# Patient Record
Sex: FEMALE | Race: BLACK OR AFRICAN AMERICAN | NOT HISPANIC OR LATINO | Employment: FULL TIME | URBAN - METROPOLITAN AREA
[De-identification: names, ages, dates, MRNs, and addresses within clinical notes are randomized per-mention and may not be internally consistent; named-entity substitution may affect disease eponyms.]

---

## 2023-03-25 ENCOUNTER — ANCILLARY PROCEDURE (OUTPATIENT)
Dept: GENERAL RADIOLOGY | Facility: CLINIC | Age: 18
End: 2023-03-25
Attending: FAMILY MEDICINE
Payer: COMMERCIAL

## 2023-03-25 DIAGNOSIS — R76.12 POSITIVE QUANTIFERON-TB GOLD TEST: ICD-10-CM

## 2023-03-25 PROCEDURE — 71046 X-RAY EXAM CHEST 2 VIEWS: CPT | Performed by: RADIOLOGY

## 2023-11-18 ENCOUNTER — NURSE TRIAGE (OUTPATIENT)
Dept: NURSING | Facility: CLINIC | Age: 18
End: 2023-11-18
Payer: COMMERCIAL

## 2023-11-18 NOTE — TELEPHONE ENCOUNTER
Patient calling about pain in her left foot that has been going on for the past 6 months. Patient reports that this morning when she woke up the pain in her feet was severe and unable to walk for 30 minutes. Patient states the heel and the arch of her foot is where the pain is the worst.  Patient has been treating with oil and tylenol and stretching. The shoes that she wears does not make a difference.   Pain improved after tylenol.   Patient feels like the left foot is not as strong anymore.   Protocol recommends see physician within 24 hours   location and hours given to patient.   Patient will present to [facility] and will call back with worsening symptoms.   Colleen Garcia RN   11/18/23 11:20 AM  [facility] Nurse Advisor      Reason for Disposition   Weakness (i.e., loss of strength) of new-onset in foot or toes  (Exceptions: not truly weak, foot feels weak because of pain; weakness present > 2 weeks)    Additional Information   Negative: Followed a foot injury   Negative: Diabetes mellitus   Negative: Toe pain is main symptom   Negative: Ankle pain is main symptom   Negative: Thigh or calf pain is main symptom   Negative: Entire foot is cool or blue in comparison to other foot   Negative: Purple or black skin on foot or toe   Negative: [1] Red area or streak AND [2] fever   Negative: [1] Swollen foot AND [2] fever   Negative: Patient sounds very sick or weak to the triager   Negative: [1] SEVERE pain (e.g., excruciating, unable to do any normal activities) AND [2] not improved after 2 hours of pain medicine   Negative: [1] Looks infected (spreading redness, pus) AND [2] large red area (> 2 in. or 5 cm)   Negative: Looks like a boil, infected sore, or deep ulcer   Negative: [1] Redness of the skin AND [2] no fever   Negative: [1] Swollen foot AND [2] no fever  (Exceptions: localized bump from bunions, calluses, insect bite, sting)    Protocols used: Foot Pain-A-

## 2024-01-16 NOTE — TELEPHONE ENCOUNTER
Action January 16, 2024 2:24 PM MT   Action Taken Sent a request for imaging from [facility].   Note: Received on 1/16/24.     DIAGNOSIS: Left Heel Pain   APPOINTMENT DATE: 01/19/2024   NOTES STATUS DETAILS   OFFICE NOTE from referring provider SELF    OFFICE NOTE from other specialist Internal 11/18/2023 - Nurse Triage   DISCHARGE REPORT from the ER Care Everywhere 11/22/2023 - [facility]  ED   XRAYS (IMAGES & REPORTS) PACS [facility]:  11/22/2023 - Left Foot

## 2024-01-19 ENCOUNTER — OFFICE VISIT (OUTPATIENT)
Dept: ORTHOPEDICS | Facility: CLINIC | Age: 19
End: 2024-01-19
Payer: COMMERCIAL

## 2024-01-19 ENCOUNTER — PRE VISIT (OUTPATIENT)
Dept: ORTHOPEDICS | Facility: CLINIC | Age: 19
End: 2024-01-19

## 2024-01-19 DIAGNOSIS — M79.671 PAIN OF BOTH HEELS: ICD-10-CM

## 2024-01-19 DIAGNOSIS — M72.2 PLANTAR FASCIITIS: Primary | ICD-10-CM

## 2024-01-19 DIAGNOSIS — M79.672 PAIN OF BOTH HEELS: ICD-10-CM

## 2024-01-19 PROCEDURE — 99203 OFFICE O/P NEW LOW 30 MIN: CPT | Performed by: PODIATRIST

## 2024-01-19 NOTE — PROGRESS NOTES
Date of Service: 1/19/2024    Chief Complaint:   Chief Complaint   Patient presents with    Consult     Pain, left heel.         HPI: Chantel is a 18 year old female who presents today for further evaluation of left heel pain.  Nature: sharp    Location: left heel    Duration: 1 year    Onset: gradual. No trauma    Course: worsening    Aggravating/alleviating factors: + post-static dyskinesia.     Previous Treatments: RICE.   She is starting to get pain in the right heel, though not as intense.       Review of Systems: No n/v/d/f/c/ns/sob/cp    PMH: No past medical history on file.    PSxH: No past surgical history on file.    Allergies: Patient has no allergy information on record.    SH:   Social History     Socioeconomic History    Marital status: Single     Spouse name: Not on file    Number of children: Not on file    Years of education: Not on file    Highest education level: Not on file   Occupational History    Not on file   Tobacco Use    Smoking status: Not on file    Smokeless tobacco: Not on file   Substance and Sexual Activity    Alcohol use: Not on file    Drug use: Not on file    Sexual activity: Not on file   Other Topics Concern    Not on file   Social History Narrative    Not on file     Social Determinants of Health     Financial Resource Strain: Not on file   Food Insecurity: Not on file   Transportation Needs: Not on file   Physical Activity: Not on file   Stress: Not on file   Social Connections: Not on file   Interpersonal Safety: Not on file   Housing Stability: Not on file       FH: No family history on file.    Objective:  Data Unavailable Data Unavailable Data Unavailable Data Unavailable Data Unavailable 0 lbs 0 oz    PT and DP pulses are 2/4 bilaterally. CRT is instant. Positive pedal hair.   Gross sensation is intact bilaterally.   Equinus is noted bilaterally. No pain with active or passive ROM of the ankle, MTJ, 1st ray, or halluces bilaterally,. Pain noted with palpation of the  medial attachment of the plantar fascia on the calcaneus on the left. No pain laterally. No pain in the medial band of the PF. No pain with lateral calc squeeze.,   Nails normal bilaterally. No open lesions are noted.     Assessment:   Encounter Diagnoses   Name Primary?    Plantar fasciitis Yes    Pain of both heels          Plan:  - Pt seen and evaluated.  - Orthotics molded and sent to the lab.  - Start PT.   - Night splint dispensed.  - Can escalate tx if these measures do not help.  - See again in 6 weeks.

## 2024-01-19 NOTE — NURSING NOTE
DME FITTING    Relevant Diagnosis: Left heel pain.   Night splint brace was fit on patient's Left foot.     Person(s) involved in teaching:   Patient    Brace was applied in standard [place]:  Yes  Brace fit well:  Yes  Patient reports brace to fit comfortably:  Yes    Education:   Patient shown self application and removal of brace: Yes  Patient shown how to adjust brace fit, if necessary: Yes  Patient educated on billing and return policy: Yes  Patient confirmed understanding when and how to contact clinic with concerns: Yes

## 2024-01-19 NOTE — LETTER
1/19/2024         RE: Chantel Cherry  [address] [facility] [address]        Dear Colleague,    Thank you for referring your patient, Chantel Cherry, to the [facility] [place]. Please see a copy of my visit note below.    Date of Service: 1/19/2024    Chief Complaint:   Chief Complaint   Patient presents with    Consult     Pain, left heel.         HPI: Chantel is a 18 year old female who presents today for further evaluation of left heel pain.  Nature: sharp    Location: left heel    Duration: 1 year    Onset: gradual. No trauma    Course: worsening    Aggravating/alleviating factors: + post-static dyskinesia.     Previous Treatments: RICE.   She is starting to get pain in the right heel, though not as intense.       Review of Systems: No n/v/d/f/c/ns/sob/cp    PMH: No past medical history on file.    PSxH: No past surgical history on file.    Allergies: Patient has no allergy information on record.    SH:   Social History     Socioeconomic History    Marital status: Single     Spouse name: Not on file    Number of children: Not on file    Years of education: Not on file    Highest education level: Not on file   Occupational History    Not on file   Tobacco Use    Smoking status: Not on file    Smokeless tobacco: Not on file   Substance and Sexual Activity    Alcohol use: Not on file    Drug use: Not on file    Sexual activity: Not on file   Other Topics Concern    Not on file   Social History Narrative    Not on file     Social Determinants of Health     Financial Resource Strain: Not on file   Food Insecurity: Not on file   Transportation Needs: Not on file   Physical Activity: Not on file   Stress: Not on file   Social Connections: Not on file   Interpersonal Safety: Not on file   Housing Stability: Not on file       FH: No family history on file.    Objective:  Data Unavailable Data Unavailable Data Unavailable Data Unavailable Data Unavailable 0 lbs 0 oz    PT and  DP pulses are 2/4 bilaterally. CRT is instant. Positive pedal hair.   Gross sensation is intact bilaterally.   Equinus is noted bilaterally. No pain with active or passive ROM of the ankle, MTJ, 1st ray, or halluces bilaterally,. Pain noted with palpation of the medial attachment of the plantar fascia on the calcaneus on the left. No pain laterally. No pain in the medial band of the PF. No pain with lateral calc squeeze.,   Nails normal bilaterally. No open lesions are noted.     Assessment:   Encounter Diagnoses   Name Primary?    Plantar fasciitis Yes    Pain of both heels          Plan:  - Pt seen and evaluated.  - Orthotics molded and sent to the lab.  - Start PT.   - Night splint dispensed.  - Can escalate tx if these measures do not help.  - See again in 6 weeks.            Kenneth Clark DPM

## 2024-04-20 NOTE — TELEPHONE ENCOUNTER
DIAGNOSIS: Plantar Fascitis Left Foot, [facility], Self, xrays on file. Just completed PT and wants to discuss injections.     APPOINTMENT DATE: 4.22.24   NOTES STATUS DETAILS   OFFICE NOTE from other specialist Internal 1.19.24  [facility]  Ortho   DISCHARGE REPORT from the ER CE 11.22.23  [facility]   MEDICATION LIST Internal    XRAYS (IMAGES & REPORTS) Pacs 11.22.23  XR Foot Left

## 2024-04-22 ENCOUNTER — OFFICE VISIT (OUTPATIENT)
Dept: ORTHOPEDICS | Facility: CLINIC | Age: 19
End: 2024-04-22
Payer: COMMERCIAL

## 2024-04-22 ENCOUNTER — PRE VISIT (OUTPATIENT)
Dept: ORTHOPEDICS | Facility: CLINIC | Age: 19
End: 2024-04-22

## 2024-04-22 DIAGNOSIS — M72.2 PLANTAR FASCIITIS, BILATERAL: Primary | ICD-10-CM

## 2024-04-22 PROCEDURE — 99203 OFFICE O/P NEW LOW 30 MIN: CPT | Performed by: FAMILY MEDICINE

## 2024-04-22 NOTE — LETTER
4/22/2024      RE: Chantel Cherry  [address] [facility] [address]     Dear Colleague,    Thank you for referring your patient, Chantel Cherry, to the [facility] SP[facility]IC[facility]. Please see a copy of my visit note below.      [facility]  SPORTS & ORTHOPEDIC CLINIC VISIT     Apr 22, 2024        ASSESSMENT & PLAN    18-year-old with plantar fasciitis has been refractory to inserts and night splinting.    Reviewed imaging and assessment with patient in detail  Have strongly encouraged her to follow-up with physical therapy.  Home exercise program was provided.  She is interested in steroid injection.  Explained that this is not a procedure that I performed but would be happy to assist her with scheduling with one of our podiatry partners who performs this procedure.  We briefly discussed additional procedures such as Tenex could be done if she continues to have refractory pain    Jason Zamorano MD  [facility] SP[facility]    >15 min were spent on the day of visit in direct face-to-face contact, record review, and documentation  Jason Zamorano MD    -----  Chief Complaint   Patient presents with     Left Foot - Pain       SUBJECTIVE  Chantel Cherry is a/an 18 year old female who is seen as a self referral for evaluation of  bilateral plantar fascitis .     The patient is seen with their father.  The patient is Right handed    Onset: 1 years(s) ago. Reports insidious onset without acute precipitating event.  Location of Pain: bilateral foot   Worsened by: Walking, waking up in the am, sitting for too long and then standing  Better with: Massaging with hot oil, massage gun   Treatments tried: massaging   Associated symptoms: no distal numbness or tingling; denies swelling or warmth    Orthopedic/Surgical history: NO  Social History/Occupation: Student/ work at [employer]       REVIEW OF SYSTEMS:  Do you have fever, chills,  weight loss? No  Do you have any vision problems? No  Do you have any chest pain or edema? No  Do you have any shortness of breath or wheezing?  No  Do you have stomach problems? No  Do you have any numbness or focal weakness? No  Do you have diabetes? No  Do you have problems with bleeding or clotting? No  Do you have an rashes or other skin lesions? No    OBJECTIVE:  There were no vitals taken for this visit.     Bilateral foot\: TTP over the medial aspect of the calcaneus.  No tenderness over the remainder of the plantar surface of foot.  Positive windlass    RADIOLOGY:    No imaging this visit             Again, thank you for allowing me to participate in the care of your patient.      Sincerely,    Jason Zamorano MD

## 2024-04-22 NOTE — PATIENT INSTRUCTIONS
Plantar Fasciitis Education    WHAT IS PLANTAR FASCIITIS?    Plantar fasciitis is painful irritation of the tissue on the bottom of your foot between the ball of the foot and the heel. This tough tissue that supports the arch of your foot is called fascia.    WHAT IS THE CAUSE?    Plantar fasciitis can be caused by a number of things, like:    A lot of running, jumping, walking, or stair-climbing  Wearing high heels for long periods of time  Gaining weight  If you are a runner, you may get plantar fasciitis when you start running further during each workout or if you run more often. It can also happen if you start running on a different surface or in different terrain, or if your shoes are worn out and don't give enough cushioning for your heels.    If you wear high-heeled shoes, including western-style boots, the fascia can get shorter. You may then have pain when you stretch the fascia. This painful stretching might happen, for example, when you walk barefoot after getting out of bed in the morning.    If you gain weight, you may put more stress on your feet, especially if you walk a lot or  shoes with poor cushioning.    If the arches of your foot are unusually high or low, you are more likely to develop plantar fasciitis than if your arches are normal.    WHAT ARE THE SYMPTOMS?    The main symptom of plantar fasciitis is heel pain when you walk. You may also feel pain when you stand and possibly even when you are resting. This pain typically occurs first thing in the morning when you get out of bed and put your foot flat on the floor, stretching the fascia. The pain may lessen after you have been up for a while and walking, but then the pain may come back after you have been resting.    You may not have any pain when you are sleeping because the position of your feet during rest allows the fascia to shorten and relax.    HOW IS IT DIAGNOSED?    Your healthcare provider will ask about your symptoms,  activities, and medical history and examine your foot and ankle. You may have an X-ray of your heel.    HOW IS IT TREATED?    Give your painful heel lots of rest. You will need to change or stop doing the activities that cause pain until your foot has healed. You may need to stay completely off your foot for several days when the pain is severe.    Your healthcare provider may recommend stretching and strengthening exercises to help you heal. Exercises to make the foot stronger and to stretch the fascia are very important.    Your healthcare provider may recommend shoe inserts called arch supports or orthotics. You can buy them at a pharmacy or athletic shoe store or they can be custom made. Make sure the arch supports are firm. If you can easily bend them in half, they may be too flexible. These supports can be particularly helpful if you have flat feet or high arches. Wearing athletic shoes or heel cushions in both shoes may also help. Cushions are most helpful if you are overweight or an older adult.    A night splint may help keep the plantar fascia stretched while you are sleeping.    Your provider may give you a shot of steroid medicine. Your healthcare provider may have other treatments to suggest.    With treatment, plantar fasciitis may take up to several months to heal. You may find that the pain comes and goes. Talk to your healthcare provider if you do not feel improvement with treatment.    HOW CAN I TAKE CARE OF MYSELF?    To help relieve pain:    Rest your heel on an ice pack, gel pack, or package of frozen vegetables wrapped in a cloth every 3 to 4 hours for up to 20 minutes at a time.  Keep your foot up on a pillow when you sit or lie down.  Take nonprescription pain medicine, such as acetaminophen, ibuprofen, or naproxen. Read the label and take as directed. Nonsteroidal anti-inflammatory medicines (NSAIDs), such as ibuprofen or naproxen, may cause stomach bleeding and other problems. These risks  increase with age. Unless recommended by your healthcare provider, do not take an NSAID for more than 10 days.  Follow your healthcare provider's instructions, including any exercises recommended by your provider. Ask your provider:    How and when you will hear your test results  How long it will take to recover  What activities you should avoid, including how much you can lift, and when you can return to your normal activities  How to take care of yourself at home  What symptoms or problems you should watch for and what to do if you have them  Make sure you know when you should come back for a checkup.    HOW CAN I HELP PREVENT PLANTAR FASCIITIS?    The best way to prevent plantar fasciitis is to wear well-made shoes that fit your feet and give good arch support and cushioning. This is especially important if you exercise or walk a lot or stand for a long time on hard surfaces. Get new athletic shoes before your old shoes stop supporting and cushioning your feet.    You should also:    Avoid repeated jarring to the heel.  Keep a healthy weight.  Do leg and foot stretching exercises regularly.    Developed by [employer].  Published by [employer].  Copyright  2014 [employer] and/or one of its subsidiaries. All rights reserved.      Plantar Fasciitis Exercises:    You may start strengthening the muscles of your hip and stretching the muscles of your foot right away.    Prone hip extension: Lie on your stomach with your legs straight out behind you. Fold your arms under your head and rest your head on your arms. Draw your belly button in towards your spine and tighten your abdominal muscles. Tighten the buttocks and thigh muscles of the leg on your injured side and lift the leg off the floor about 8 inches. Keep your leg straight. Hold for 5 seconds. Then lower your leg and relax. Do 2 sets of 15.    Side-lying leg lift: Lie on your uninjured side. Tighten the front thigh muscles on your injured leg and  lift that leg 8 to 10 inches (20 to 25 centimeters) away from the other leg. Keep the leg straight and lower it slowly. Do 2 sets of 15.    *Frozen can roll: Roll your bare injured foot back and forth from your heel to your mid-arch over a frozen juice can. Repeat for 3 to 5 minutes. This exercise is particularly helpful if it is done first thing in the morning.    *Towel stretch: Sit on a hard surface with your injured leg stretched out in front of you. Loop a towel around your toes and the ball of your foot and pull the towel toward your body keeping your leg straight. Hold this position for 15 to 30 seconds and then relax. Repeat 3 times.    *Standing calf stretch: Stand facing a wall with your hands on the wall at about eye level. Keep your injured leg back with your heel on the floor. Keep the other leg forward with the knee bent. Turn your back foot slightly inward (as if you were pigeon-toed). Slowly lean into the wall until you feel a stretch in the back of your calf. Hold the stretch for 15 to 30 seconds. Return to the starting position. Repeat 3 times. Do this exercise several times each day.    *Seated plantar fascia stretch: Sit in a chair and cross the injured foot over the knee of your other leg. Place your fingers over the base of your toes and pull them back toward your shin until you feel a comfortable stretch in the arch of your foot. Hold 15 seconds and repeat 3 times.    *Plantar fascia massage: Sit in a chair and cross the injured foot over the knee of your other leg. Place your fingers over the base of the toes of your injured foot and pull your toes toward your shin until you feel a stretch in the arch of your foot. With your other hand, massage the bottom of your foot, moving from the heel toward your toes. Do this for 3 to 5 minutes. Start gently. Press harder on the bottom of your foot as you become able to tolerate more pressure.    Achilles stretch: Stand with the ball of one foot on a  stair. Reach for the step below with your heel until you feel a stretch in the arch of your foot. Hold this position for 15 to 30 seconds and then relax. Repeat 3 times.  After you have stretched the bottom muscles of your foot, you can do these exercises to start strengthening the muscles of your foot.    Towel pickup: With your heel on the ground,  a towel with your toes. Release. Repeat 10 to 20 times. When this gets easy, add more resistance by placing a book or small weight on the towel.    Balance and reach exercises: Stand next to a chair with your injured leg farther from the chair. The chair will provide support if you need it. Stand on the foot of your injured leg and bend your knee slightly. Try to raise the arch of this foot while keeping your big toe on the floor. Keep your foot in this position.  With the hand that is farther away from the chair, reach forward in front of you by bending at the waist. Avoid bending your knee any more as you do this. Repeat this 15 times. To make the exercise more challenging, reach farther in front of you. Do 2 sets of 15.    While keeping your arch raised, reach the hand that is farther away from the chair across your body toward the chair. The farther you reach, the more challenging the exercise. Do 2 sets of 15.    Heel raise: Stand behind a chair or counter with both feet flat on the floor. Using the chair or counter as a support, rise up onto your toes and hold for 5 seconds. Then slowly lower yourself down without holding onto the support. (It's OK to keep holding onto the support if you need to.) When this exercise becomes less painful, try doing this exercise while you are standing on the injured leg only. Repeat 15 times. Do 2 sets of 15. Rest 30 seconds between sets.    Developed by [employer].  Published by [employer].  Copyright  2014 [employer] and/or one of its subsidiaries. All rights reserved.

## 2024-04-22 NOTE — PROGRESS NOTES
[facility]  SPORTS & ORTHOPEDIC CLINIC VISIT     Apr 22, 2024        ASSESSMENT & PLAN    18-year-old with plantar fasciitis has been refractory to inserts and night splinting.    Reviewed imaging and assessment with patient in detail  Have strongly encouraged her to follow-up with physical therapy.  Home exercise program was provided.  She is interested in steroid injection.  Explained that this is not a procedure that I performed but would be happy to assist her with scheduling with one of our podiatry partners who performs this procedure.  We briefly discussed additional procedures such as Tenex could be done if she continues to have refractory pain    Jason Zamorano MD  [facility]IC[facility] CL[facility] [place]    >15 min were spent on the day of visit in direct face-to-face contact, record review, and documentation  Jason Zamorano MD    -----  Chief Complaint   Patient presents with    Left Foot - Pain       SUBJECTIVE  Chantel Cherry is a/an 18 year old female who is seen as a self referral for evaluation of  bilateral plantar fascitis .     The patient is seen with their father.  The patient is Right handed    Onset: 1 years(s) ago. Reports insidious onset without acute precipitating event.  Location of Pain: bilateral foot   Worsened by: Walking, waking up in the am, sitting for too long and then standing  Better with: Massaging with hot oil, massage gun   Treatments tried: massaging   Associated symptoms: no distal numbness or tingling; denies swelling or warmth    Orthopedic/Surgical history: NO  Social History/Occupation: Student/ work at [employer]       REVIEW OF SYSTEMS:  Do you have fever, chills, weight loss? No  Do you have any vision problems? No  Do you have any chest pain or edema? No  Do you have any shortness of breath or wheezing?  No  Do you have stomach problems? No  Do you have any numbness or focal weakness? No  Do you have diabetes? No  Do you have  problems with bleeding or clotting? No  Do you have an rashes or other skin lesions? No    OBJECTIVE:  There were no vitals taken for this visit.     Bilateral foot\: TTP over the medial aspect of the calcaneus.  No tenderness over the remainder of the plantar surface of foot.  Positive windlass    RADIOLOGY:    No imaging this visit

## 2024-05-02 ENCOUNTER — TELEPHONE (OUTPATIENT)
Dept: ORTHOPEDICS | Facility: CLINIC | Age: 19
End: 2024-05-02
Payer: COMMERCIAL

## 2024-05-02 NOTE — TELEPHONE ENCOUNTER
Called to help r/s 5/21 [facility] appt with another podiatrist. Transferred to call center